# Patient Record
Sex: FEMALE | Race: ASIAN | NOT HISPANIC OR LATINO | Employment: FULL TIME | URBAN - METROPOLITAN AREA
[De-identification: names, ages, dates, MRNs, and addresses within clinical notes are randomized per-mention and may not be internally consistent; named-entity substitution may affect disease eponyms.]

---

## 2017-01-24 ENCOUNTER — TRANSCRIBE ORDERS (OUTPATIENT)
Dept: ADMINISTRATIVE | Facility: HOSPITAL | Age: 42
End: 2017-01-24

## 2017-01-24 DIAGNOSIS — Z13.9 SCREENING: Primary | ICD-10-CM

## 2017-02-10 ENCOUNTER — HOSPITAL ENCOUNTER (OUTPATIENT)
Dept: MAMMOGRAPHY | Facility: HOSPITAL | Age: 42
Discharge: HOME | End: 2017-02-10
Admitting: INTERNAL MEDICINE

## 2017-02-10 DIAGNOSIS — Z13.9 SCREENING: ICD-10-CM

## 2017-02-10 PROCEDURE — G0202 SCR MAMMO BI INCL CAD: HCPCS

## 2021-12-20 ENCOUNTER — OFFICE VISIT (OUTPATIENT)
Dept: OBSTETRICS AND GYNECOLOGY | Facility: CLINIC | Age: 46
End: 2021-12-20

## 2021-12-20 VITALS
WEIGHT: 115.4 LBS | HEIGHT: 65 IN | SYSTOLIC BLOOD PRESSURE: 118 MMHG | BODY MASS INDEX: 19.22 KG/M2 | DIASTOLIC BLOOD PRESSURE: 64 MMHG

## 2021-12-20 DIAGNOSIS — Z01.419 ENCOUNTER FOR GYNECOLOGICAL EXAMINATION WITHOUT ABNORMAL FINDING: Primary | ICD-10-CM

## 2021-12-20 PROCEDURE — 99386 PREV VISIT NEW AGE 40-64: CPT | Performed by: OBSTETRICS & GYNECOLOGY

## 2021-12-20 RX ORDER — UBIDECARENONE 200 MG
CAPSULE ORAL DAILY
COMMUNITY

## 2021-12-20 NOTE — PROGRESS NOTES
"GYN Annual Exam     CC- Here for annual exam.     Stephanie Parker is a 46 y.o. female who presents for annual well woman exam. Periods are regular every 23-25 days, lasting a few days. Dysmenorrhea:none. Cyclic symptoms include headache.  She has premenstrual headache.  No intermenstrual bleeding, spotting, or discharge.    OB History        1    Para   0    Term   0            AB   1    Living           SAB   1    IAB        Ectopic        Molar        Multiple        Live Births                    Current contraception: none  History of abnormal Pap smear: no  Family history of uterine, colon or ovarian cancer: no  History of abnormal mammogram: no  Family history of breast cancer: no  Last Pap : May 2020    Past Medical History:   Diagnosis Date   • Infertility counseling        Past Surgical History:   Procedure Laterality Date   • OVARIAN CYST SURGERY     • PELVIC LAPAROSCOPY     • WISDOM TOOTH EXTRACTION           Current Outpatient Medications:   •  Coenzyme Q10 200 MG capsule, Take  by mouth Daily., Disp: , Rfl:     No Known Allergies    Social History     Tobacco Use   • Smoking status: Never Smoker   • Smokeless tobacco: Never Used   Vaping Use   • Vaping Use: Never used   Substance Use Topics   • Alcohol use: Not Currently   • Drug use: Never       Family History   Problem Relation Age of Onset   • Hypertension Mother    • Deep vein thrombosis Paternal Grandfather    • Deep vein thrombosis Paternal Grandmother        Review of Systems   Constitutional: Negative for fatigue and fever.   Genitourinary: Negative for menstrual problem, pelvic pain and urinary incontinence.   Neurological: Positive for headache.        Immediate premenstrual headache       /64   Ht 165.1 cm (65\")   Wt 52.3 kg (115 lb 6.4 oz)   LMP 2021   BMI 19.20 kg/m²     Physical Exam  Constitutional:       Appearance: She is normal weight.   Genitourinary:      Bladder and urethral meatus normal.      Right Labia: " No lesions.     Left Labia: No lesions.     No vaginal discharge, tenderness or bleeding.      No vaginal prolapse present.     No vaginal atrophy present.       Right Adnexa: not tender, not full and no mass present.     Left Adnexa: not tender, not full and no mass present.     No cervical discharge or lesion.      Uterus is enlarged and irregular.      Uterus is not tender or prolapsed.      No uterine mass detected.     Uterus is midaxial.   Breasts:      Right: No mass, nipple discharge, skin change or tenderness.      Left: No mass, nipple discharge, skin change or tenderness.       Neck:      Thyroid: No thyroid mass or thyromegaly.   Abdominal:      General: Abdomen is flat.      Palpations: Abdomen is soft. There is no mass.      Tenderness: There is no abdominal tenderness.   Neurological:      Mental Status: She is alert.   Vitals reviewed.               Assessment     1) GYN annual well woman exam.   2) menstrual migraine.  She will start with acetaminophen plus or minus Excedrin Migraine.  She will call if this does not alleviate and if she is interested in moving toward some type of hormone management.     Plan     1) Breast Health - Clinical breast exam yearly, Discussed [employer] cancer society recommendations for breast cancer screening, and Self breast awareness monthly  2) Pap -done with high risk HPV  3) Smoking status-negative  4) Encouraged to be wary of information obtained via social media and internet based on source and search.  5) Follow up prn and one year.       Saqib Angel MD   12/20/2021  14:28 EST

## 2021-12-23 ENCOUNTER — PATIENT ROUNDING (BHMG ONLY) (OUTPATIENT)
Dept: OBSTETRICS AND GYNECOLOGY | Facility: CLINIC | Age: 46
End: 2021-12-23

## 2021-12-23 LAB
CYTOLOGIST CVX/VAG CYTO: NORMAL
CYTOLOGY CVX/VAG DOC CYTO: NORMAL
CYTOLOGY CVX/VAG DOC THIN PREP: NORMAL
DX ICD CODE: NORMAL
HIV 1 & 2 AB SER-IMP: NORMAL
HPV I/H RISK 4 DNA CVX QL PROBE+SIG AMP: NEGATIVE
OTHER STN SPEC: NORMAL
STAT OF ADQ CVX/VAG CYTO-IMP: NORMAL

## 2021-12-23 NOTE — PROGRESS NOTES
"December 23, 2021    Hello, may I speak with Stephanie Parker?    My name is RAZIA Sterling      I am  with ROSARIO MARS  [facility]  [address]  [facility] [address] [phone].    Before we get started may I verify your date of birth? 1975    I am calling to officially welcome you to our practice and ask about your recent visit. Is this a good time to talk? yes    Tell me about your visit with us. What things went well?  Patient stated everything went fine. No complaints.       We're always looking for ways to make our patients' experiences even better. Do you have recommendations on ways we may improve?  no    Overall were you satisfied with your first visit to our practice? Yes-\"very\"       I appreciate you taking the time to speak with me today. Is there anything else I can do for you? no      Thank you, and have a great day.      "